# Patient Record
Sex: FEMALE | URBAN - METROPOLITAN AREA
[De-identification: names, ages, dates, MRNs, and addresses within clinical notes are randomized per-mention and may not be internally consistent; named-entity substitution may affect disease eponyms.]

---

## 2018-05-18 ENCOUNTER — DOCUMENTATION ONLY (OUTPATIENT)
Dept: RADIATION ONCOLOGY | Facility: CLINIC | Age: 73
End: 2018-05-18

## 2018-05-18 NOTE — PROGRESS NOTES
Gayathri Bryant  [id number]  1945 5/18/2018  No referring provider defined for this encounter.    DIAGNOSIS: Lung and brain masses consistent with malignancy  TREATMENT SITE(S): Bulky left upper lobe, lung mass eroding into chest wall and whole brain     INTENT: PALLIATIVE    TREATMENT SETTING: RT ALONE     MODALITY: PHOTON    TECHNIQUE:  3D CONFORMAL RADIOTHERAPY (3DCRT)    IMRT MEDICAL NECESSITY:IMRT MEDICAL NECESSITY: N/A     HPI: 72-year-old patient, heavy smoker presented with left chest wall pain found to have a large bulky mass measuring 15-20 cm eroding into the chest wall, and to brain metastasis measuring 2 and 3 cm. Treatment is palliative. Biopsy not obtained    I have personally performed treatment planning for the patient, reviewing relevant history/physical and imaging. I have defined GTV, CTV, PTV and organs at risk.     In order to accomplish this plan, I am ordering:  SIMULATION: CT SIMULATION FOR PLACEMENT OF TREATMENT FIELDS    CONTRAST: none    TO ACCOMPLISH REPRODUCIBLE POSITION: VACLOC and AQUAPLAST MASK    DEVICES FOR BEAM SHAPING: CUSTOMIZED MLC    CUSTOMIZED BOLUS: none    IMAGING: DAILY KV/KV OBI    I have ordered a weekly physics check.  SPECIAL PHYSICS CONSULT: NO  REASON: N/A    SPECIAL TREATMENT CIRCUMSTANCE: NO   Concurrent or recent administration of chemotherapeutic agents which are  known potent radiosensitizers and thus will require vigilant monitoring for  exaggerated radiation toxicities.    LABS: NONE    ANTICIPATED PRESCRIPTION: 30 gray in 10 fractions to the left-sided bulky lung mass and whole brain .    TREATMENT: DAILY    PHYSICIAN: Crow Metcalf MD

## 2018-05-24 ENCOUNTER — DOCUMENTATION ONLY (OUTPATIENT)
Dept: RADIATION ONCOLOGY | Facility: CLINIC | Age: 73
End: 2018-05-24

## 2018-05-24 DIAGNOSIS — C79.49 SECONDARY MALIGNANT NEOPLASM OF BRAIN AND SPINAL CORD: Primary | ICD-10-CM

## 2018-05-24 DIAGNOSIS — C79.31 SECONDARY MALIGNANT NEOPLASM OF BRAIN AND SPINAL CORD: Primary | ICD-10-CM
